# Patient Record
Sex: FEMALE | Race: OTHER | HISPANIC OR LATINO | URBAN - METROPOLITAN AREA
[De-identification: names, ages, dates, MRNs, and addresses within clinical notes are randomized per-mention and may not be internally consistent; named-entity substitution may affect disease eponyms.]

---
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
## 2018-02-26 ENCOUNTER — ASOB RESULT (OUTPATIENT)
Age: 18
End: 2018-02-26

## 2018-02-26 ENCOUNTER — APPOINTMENT (OUTPATIENT)
Dept: ANTEPARTUM | Facility: CLINIC | Age: 18
End: 2018-02-26
Payer: MEDICAID

## 2018-02-26 PROBLEM — Z00.00 ENCOUNTER FOR PREVENTIVE HEALTH EXAMINATION: Status: ACTIVE | Noted: 2018-02-26

## 2018-02-26 PROCEDURE — 76811 OB US DETAILED SNGL FETUS: CPT

## 2018-02-26 PROCEDURE — 76817 TRANSVAGINAL US OBSTETRIC: CPT

## 2018-06-06 ENCOUNTER — ASOB RESULT (OUTPATIENT)
Age: 18
End: 2018-06-06

## 2018-06-06 ENCOUNTER — APPOINTMENT (OUTPATIENT)
Dept: ANTEPARTUM | Facility: CLINIC | Age: 18
End: 2018-06-06
Payer: MEDICAID

## 2018-06-06 PROCEDURE — 76816 OB US FOLLOW-UP PER FETUS: CPT

## 2024-06-05 ENCOUNTER — APPOINTMENT (OUTPATIENT)
Dept: ANTEPARTUM | Facility: CLINIC | Age: 24
End: 2024-06-05
Payer: MEDICAID

## 2024-06-05 ENCOUNTER — ASOB RESULT (OUTPATIENT)
Age: 24
End: 2024-06-05

## 2024-06-05 PROCEDURE — 76830 TRANSVAGINAL US NON-OB: CPT

## 2024-06-05 PROCEDURE — 76856 US EXAM PELVIC COMPLETE: CPT | Mod: 59

## 2025-04-13 NOTE — ED ADULT NURSE NOTE - OBJECTIVE STATEMENT
18
pt prseemts to ED with lower abd cramping and vaginal spotting since yesterday. pt states she found out her was pregnant two weeks ago, LMP was 9/11/17. pt c/o n/v x one week. a&ox3, will continue to monitor and reassess